# Patient Record
Sex: MALE | Race: WHITE | NOT HISPANIC OR LATINO | URBAN - METROPOLITAN AREA
[De-identification: names, ages, dates, MRNs, and addresses within clinical notes are randomized per-mention and may not be internally consistent; named-entity substitution may affect disease eponyms.]

---

## 2022-09-28 ENCOUNTER — TELEPHONE (OUTPATIENT)
Dept: UROLOGY | Facility: MEDICAL CENTER | Age: 32
End: 2022-09-28

## 2022-09-28 NOTE — TELEPHONE ENCOUNTER
Please Triage New Patient    What is the reason for the patients appointment? Patient had a US Testicles done in August - his PCP sent him to get an [address]  He was having swelling, he felt a pressure "like it was being grabbed"  It comes and goes  No swelling or pain at the moment  Imaging/Lab Results: US testicles done - he thinks it was 8/23    done at [facility] in [facility] he had a 5mm cyst on left testicle  Enlarged blood vessels as well  Do we accept the pt's insurance or is the patient Self-Pay? [employer]     Has the pt had any previous urologist? No     Patient's preferred office: [place]     Patient is going to get his imaging faxed over to the [facility] office       Please call him back at [phone]

## 2022-09-28 NOTE — TELEPHONE ENCOUNTER
Called patient states went to PCP due to feeling something in scrotum, US was ordered and patient went for [address] on 08/23 @ [address], pt states reached out to PCP to have report faxed over  Pt states some slight discomfort on and off, no fever/chills, no nausea/vomiting  Reports good urine stream with clear to yellow urine depending on hydration  Did advise patient best to get the report to see what it says, offered to schedule an appointment in the mean time and will see if US shows that appointment needs to be seen sooner  Offered 10/25, pt requesting only [place] and only late appointment, did offer 2:45pm but pt declined   Pt states he will reach out to [facility] to see if they can see him sooner due to his work schedule,pt states he will contact the office

## 2025-05-09 ENCOUNTER — TELEPHONE (OUTPATIENT)
Age: 35
End: 2025-05-09

## 2025-05-09 NOTE — TELEPHONE ENCOUNTER
Patient has been added to the Medication Management wait list without a referral. Pt is taking Adderall prescribed by PCP.  Pt gets off at 7 am Monday and has off that day. This is when he would like to be scheduled.  Insurance: [employer]  Insurance Type:    Commercial [x]   Medicaid []   [facility] (if applicable)   Medicare []  Location Preference: [place]  Provider Preference: Female  Virtual: Yes [] No [x]  Were outside resources sent: No

## 2025-06-18 ENCOUNTER — TELEPHONE (OUTPATIENT)
Age: 35
End: 2025-06-18

## 2025-06-18 NOTE — TELEPHONE ENCOUNTER
Pts wife called in to get the patient added to the wait list for TT. Due to no communication consent or BETTINA on file writer was unable to add the pt to the wait list without verbal consent. Pts wife said they will call back to get him added once he gets home.